# Patient Record
Sex: MALE | Race: OTHER | HISPANIC OR LATINO | URBAN - METROPOLITAN AREA
[De-identification: names, ages, dates, MRNs, and addresses within clinical notes are randomized per-mention and may not be internally consistent; named-entity substitution may affect disease eponyms.]

---
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
## 2020-01-21 NOTE — ED ADULT TRIAGE NOTE - NS ED TRIAGE AVPU SCALE
175.26 Alert-The patient is alert, awake and responds to voice. The patient is oriented to time, place, and person. The triage nurse is able to obtain subjective information.

## 2024-01-09 ENCOUNTER — OFFICE (OUTPATIENT)
Dept: URBAN - METROPOLITAN AREA CLINIC 92 | Facility: CLINIC | Age: 24
Setting detail: OPHTHALMOLOGY
End: 2024-01-09
Payer: COMMERCIAL

## 2024-01-09 DIAGNOSIS — H01.001: ICD-10-CM

## 2024-01-09 DIAGNOSIS — H01.004: ICD-10-CM

## 2024-01-09 PROBLEM — H16.223 DRY EYE SYNDROME K SICCA; BOTH EYES: Status: ACTIVE | Noted: 2024-01-09

## 2024-01-09 PROCEDURE — 92004 COMPRE OPH EXAM NEW PT 1/>: CPT | Performed by: OPHTHALMOLOGY

## 2024-01-09 ASSESSMENT — REFRACTION_AUTOREFRACTION
OS_SPHERE: -3.75
OD_AXIS: 093
OS_AXIS: 094
OD_SPHERE: -4.50
OD_CYLINDER: +3.75
OS_CYLINDER: +2.00

## 2024-01-09 ASSESSMENT — REFRACTION_CURRENTRX
OD_CYLINDER: +2.75
OD_AXIS: 085
OS_OVR_VA: 20/
OD_OVR_VA: 20/
OS_CYLINDER: +1.25
OD_SPHERE: -3.25
OS_SPHERE: -3.00
OS_AXIS: 086

## 2024-01-09 ASSESSMENT — SPHEQUIV_DERIVED
OD_SPHEQUIV: -2.625
OS_SPHEQUIV: -2.75

## 2024-01-09 ASSESSMENT — LID EXAM ASSESSMENTS
OD_BLEPHARITIS: RUL 1+ 2+
OS_BLEPHARITIS: LUL 1+ 2+

## 2024-01-09 ASSESSMENT — TEAR BREAK UP TIME (TBUT)
OD_TBUT: 1+ 2+
OS_TBUT: 1+ 2+

## 2024-01-09 ASSESSMENT — CONFRONTATIONAL VISUAL FIELD TEST (CVF)
OS_FINDINGS: FULL
OD_FINDINGS: FULL

## 2025-01-14 ENCOUNTER — OFFICE (OUTPATIENT)
Dept: URBAN - METROPOLITAN AREA CLINIC 28 | Facility: CLINIC | Age: 25
Setting detail: OPHTHALMOLOGY
End: 2025-01-14
Payer: COMMERCIAL

## 2025-01-14 DIAGNOSIS — H16.143: ICD-10-CM

## 2025-01-14 DIAGNOSIS — H16.223: ICD-10-CM

## 2025-01-14 DIAGNOSIS — H01.001: ICD-10-CM

## 2025-01-14 DIAGNOSIS — H01.004: ICD-10-CM

## 2025-01-14 PROCEDURE — 92014 COMPRE OPH EXAM EST PT 1/>: CPT | Performed by: OPHTHALMOLOGY

## 2025-01-14 ASSESSMENT — KERATOMETRY
OS_K2POWER_DIOPTERS: 44.50
OD_AXISANGLE_DEGREES: 089
OS_K1POWER_DIOPTERS: 41.75
OD_K2POWER_DIOPTERS: 45.00
OS_AXISANGLE_DEGREES: 092
OD_K1POWER_DIOPTERS: 41.75
METHOD_AUTO_MANUAL: AUTO

## 2025-01-14 ASSESSMENT — REFRACTION_AUTOREFRACTION
OD_CYLINDER: -4.00
OD_AXIS: 001
OS_CYLINDER: -2.25
OD_SPHERE: -0.50
OS_SPHERE: -1.75
OS_AXIS: 001

## 2025-01-14 ASSESSMENT — LID EXAM ASSESSMENTS
OD_BLEPHARITIS: RLL RUL 1+
OS_BLEPHARITIS: LLL LUL 1+

## 2025-01-14 ASSESSMENT — TEAR BREAK UP TIME (TBUT)
OD_TBUT: 2+
OS_TBUT: 2+

## 2025-01-14 ASSESSMENT — REFRACTION_CURRENTRX
OD_SPHERE: -3.25
OS_CYLINDER: +1.25
OD_CYLINDER: -2.75
OS_CYLINDER: -1.50
OD_AXIS: 085
OS_SPHERE: -1.75
OS_SPHERE: -3.00
OS_AXIS: 086
OD_SPHERE: -0.50
OS_OVR_VA: 20/
OD_CYLINDER: +2.75
OS_AXIS: 003
OD_OVR_VA: 20/
OS_OVR_VA: 20/
OD_OVR_VA: 20/
OD_AXIS: 002

## 2025-01-14 ASSESSMENT — VISUAL ACUITY
OD_BCVA: 20/20-1
OS_BCVA: 20/20-1

## 2025-01-14 ASSESSMENT — SUPERFICIAL PUNCTATE KERATITIS (SPK)
OS_SPK: 1+
OD_SPK: 1+

## 2025-01-14 ASSESSMENT — CONFRONTATIONAL VISUAL FIELD TEST (CVF)
OS_FINDINGS: FULL
OD_FINDINGS: FULL

## 2025-01-14 ASSESSMENT — REFRACTION_MANIFEST
OD_SPHERE: -3.25
OD_CYLINDER: +2.75
OD_AXIS: 085
OS_SPHERE: -3.00
OS_AXIS: 085
OS_CYLINDER: +1.25

## 2025-01-14 ASSESSMENT — TONOMETRY
OD_IOP_MMHG: 10
OS_IOP_MMHG: 11
